# Patient Record
Sex: FEMALE | Race: WHITE | Employment: FULL TIME | URBAN - METROPOLITAN AREA
[De-identification: names, ages, dates, MRNs, and addresses within clinical notes are randomized per-mention and may not be internally consistent; named-entity substitution may affect disease eponyms.]

---

## 2024-08-07 ENCOUNTER — APPOINTMENT (OUTPATIENT)
Dept: OBSTETRICS AND GYNECOLOGY | Facility: CLINIC | Age: 39
End: 2024-08-07
Payer: COMMERCIAL

## 2024-08-07 VITALS
BODY MASS INDEX: 40.25 KG/M2 | WEIGHT: 205 LBS | SYSTOLIC BLOOD PRESSURE: 116 MMHG | DIASTOLIC BLOOD PRESSURE: 76 MMHG | HEIGHT: 60 IN

## 2024-08-07 DIAGNOSIS — Z01.419 WELL WOMAN EXAM: Primary | ICD-10-CM

## 2024-08-07 PROCEDURE — 1036F TOBACCO NON-USER: CPT | Performed by: OBSTETRICS & GYNECOLOGY

## 2024-08-07 PROCEDURE — 99395 PREV VISIT EST AGE 18-39: CPT | Performed by: OBSTETRICS & GYNECOLOGY

## 2024-08-07 PROCEDURE — 3008F BODY MASS INDEX DOCD: CPT | Performed by: OBSTETRICS & GYNECOLOGY

## 2024-08-07 NOTE — PROGRESS NOTES
Subjective   Patient ID: Tierra Daily is a 38 y.o. female who presents for Well woman visit.  Review of my annual note from ,       Javad Pabon MD  Physician  Specialty: Obstetrics and Gynecology     Progress Notes     Signed     Encounter Date: 2023    Signed           Orders     · IO UA (nonautomated w/o microscopy); Status:Resulted - Requires  Verification,Retrospective Authorization;   Done: [id number] 08:15AM     Patient Discussion/Summary     Established patient 37-year-old annual exam. Her son Jona who is almost 2-1/2. Born by . Spontaneous pregnancy     History of unexplained infertility. 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     Well woman exam. Cycles are regular. She is taking prenatal vitamins. Next Pap will be . Initiate mammography at age 39      Chief Complaint     Here for annual exam.      Review of Systems  Constitutional: no fever,~no chills,~no recent weight gain,~no recent weight loss~and~no fatigue.   Eyes: no eye pain,~no vision problems~and~no dryness of the eyes.   ENT: no hearing loss,~no nosebleeds~and~no sinus congestion.   Cardiovascular: no chest pain,~no palpitations~and~no orthopnea.   Respiratory: no shortness of breath,~no cough~and~no wheezing.   Gastrointestinal: no abdominal pain,~no constipation,~no nausea,~no diarrhea~and~no vomiting.   Genitourinary: no dysuria,~no urinary incontinence,~no vaginal dryness,~no vaginal itching,~no dyspareunia,~no pelvic pain,~no dysmenorrhea,~no sexual problems,~no change in urinary frequency,~no vaginal discharge,~no unexplained vaginal bleeding~and~no lesion/sore.   Musculoskeletal: no back pain,~no joint swelling~and~no leg edema.   Integumentary: no rashes,~no skin lesions,~no nipple discharge,~no breast pain~and~no breast lump.   Neurological: no headache,~no  numbness~and~no dizziness.   Psychiatric: no sleep disturbances,~no anxiety~and~no depression.   Endocrine: no hot flashes,~no loss of hair~and~no hirsutism.   Hematologic/Lymphatic: no swollen glands,~no tendency for easy bleeding~and~no tendency for easy bruising.   All other systems have been reviewed and are negative for complaint.      Active Problems  Problems    · Abnormal fetal echocardiogram affecting antepartum care of mother (655.83)  (O35.BXX0)   · AMA (advanced maternal age) primigravida 35+ (659.50) (O09.519)   · Birth control (V49.89) (Z78.9)   · BMI 45.0-49.9, adult (V85.42) (Z68.42)   · Cervical smear, as part of routine gynecological examination (V76.2) (Z01.419)   · Encounter for artificial insemination (V26.1) (Z31.89)   · Encounter for prenatal care of first pregnancy (V22.0) (Z34.00)   · Fertility testing (V26.21) (Z31.41)   · Gestational diabetes (648.80) (O24.419)   · Hematuria (599.70) (R31.9)   · Irregular menses (626.4) (N92.6)   · Pap smear for cervical cancer screening (V76.2) (Z12.4)   · Placenta previa (641.10) (O44.00)   · Routine screening for STI (sexually transmitted infection) (V74.5) (Z11.3)   · Urinary frequency (788.41) (R35.0)   · Urinary tract infection (599.0) (N39.0)     Past Medical History  Problems    · History of polycystic ovarian syndrome (V13.29) (Z87.42)   · No pertinent past medical history (V49.89) (Z78.9)     Surgical History  Problems    · History of Laparoscopy (Diagnostic)     Family History  Mother    · No pertinent family history  Father    · No pertinent family history  Brother    · Family history of diabetes mellitus (V18.0) (Z83.3)     Social History  Problems    · Never a smoker   · History of Never a smoker   · Social alcohol use (Z78.9)     Allergies  NoKnown    · No Known Allergies   Recorded By: Desire Zimmerman; 9/16/2015 4:27:10 PM     Current Meds       Medication Name Instruction  Accu-Chek FastClix Lancets For glucose testing 4 times  daily  Accu-Chek Guide In Vitro Strip TEST 4 TIMES DAILY.  Accu-Chek Guide w/Device Kit For glucose testing 4 times daily  Baby Aspirin 81 MG CHEW    BD Pen Needle Mena U/F 32G X 4 MM Once daily  Iron TABS    Levemir FlexTouch 100 UNIT/ML SOPN Inject 8 units every night at bedtime  PA Prenatal Formula 9-0.5 MG TABS       Vitals  Vital Signs       Recorded: [id number] 08:18AM Recorded: [id number] 08:16AM  Systolic 114    Diastolic 76    Height   5 ft   Weight   235 lb   BMI Calculated   45.9 kg/m2  BSA Calculated   2  LMP   [id number]     1  Para   1     Physical Exam     Constitutional: Alert and in no acute distress. Well developed, well nourished   Head and Face: Head and face: normal   Eyes: Normal external exam - nonicteric sclera, extraocular movements intact (EOMI) and no ptosis.   Ears, Nose, Mouth, and Throat: External inspection of ears and nose: normal   Neck: no neck asymmetry. Supple~and~thyroid not enlarged and there were no palpable thyroid nodules   Cardiovascular: Heart rate and rhythm were normal, normal S1 and S2, no gallops, and no murmurs   Pulmonary: No respiratory distress~and~clear bilateral breath sounds   Chest: Breasts: normal appearance, no nipple discharge and no skin changes~and~palpation of breasts and axillae: no palpable mass and no axillary lymphadenopathy   Abdomen: soft nontender; no abdominal mass palpated,~no organomegaly~and~no hernias   Genitourinary: external genitalia: normal,~no inguinal lymphadenopathy,~Bartholin's urethral and [place]'s glands: normal,~urethra: normal,~bladder: normal on palpation~and~perianal area: normal   Vagina: normal.   Cervix: Normal.   Uterus: Normal.   Right Adnexa/parametria: Normal.   Left Adnexa/parametria: Normal.   Musculoskeletal: no joint swelling seen, normal movements of all extremities   Skin: normal skin color and pigmentation, normal skin turgor, and no rash.   Neurologic: cranial nerves: non-focal. grossly intact   Psychiatric:  alert and oriented x 3.,~affect normal to patient baseline~and~mood: appropriate      Results/Data    IO UA (nonautomated w/o microscopy) [id number] 08:15AM Javad Pabon       Test Name Result Flag Reference  IO Glucose - Urine Negative      IO Blood Negative      IO Protein, Urine Negative      IO Nitrite, Urine Negative      IO Leukocytes Negative      IO Glucose - Urine Negative      IO Blood Negative      IO Protein, Urine Negative      IO Nitrite, Urine Negative      IO Leukocytes Negative                      PAP GYN, Cytology Final     No Documents Attached               Accession #: [id number] Date of Procedure: 6/28/2022  Pathologist: [facility], Cytology  Date Reported: 7/12/2022  Date Received: 6/28/2022  Submitting Physician: JAVAD PABON MD  Attending Physician: JAVDA PABON MD              FINAL CYTOLOGICAL INTERPRETATION           A. THINPREP PAP CERVICAL:      Specimen Adequacy:   SATISFACTORY FOR EVALUATION.   Quality Indicator: Absence of endocervical/transformation zone component.      General Categorization:   NEGATIVE FOR INTRAEPITHELIAL LESION OR MALIGNANCY.            HIGH RISK HPV TEST RESULT:      HPV GENOTYPE 16  NEGATIVE   HPV GENOTYPE 18  NEGATIVE   HPV GENOTYPE OTHER NEGATIVE      Reference Range: Negative           Slide(s) initially screened by a Cytotechnologist at [facility], [address] [place] [address]     QC review performed at [facility], [address] [place] [address]     Testing for high-risk (HR) type of human papilloma virus (HPV) is performed by  the Roche adan HPV Test. The adan HPV Test is a qualitative polymerase chain  reaction that amplifies DNA of HPV16, HPV18 and 12 other high-risk HPV types  (31, 33, 35, 39, 45, 51, 52, 56, 58, 59, 66, and 68) associated with cervical  cancer and its precursor lesions. A positive result indicates the presence of  HPV DNA due to  one or more of the 14 genotypes: 16, 18, 31, 33, 35, 39, 45, 51,  52, 56, 58, 59, 66, and 68. Negative results indicate HPV DNA concentrations  are undetectable or below the pre-set threshold for detection. False negative  results may be associated with unoptimized sampling. A negative HR HPV result  does not exclude the possibility of future cytologic HSIL or underlying CIN2-3  or cancer.     This test is approved for cervical specimens by the US Food and Drug  Administration. Results of this test should be interpreted in conjunction with  the patient?s Pap test results. Please refer to ASCCP current guidelines for  the use of HPV DNA testing, result interpretation, and patient management.  The performance of this test was verified by the Molecular Diagnostic  Laboratory at [facility]. The lab is  certified under the Clinical Laboratory Amendments of 1988 (CLIA 88) as  qualified to perform high complexity clinical laboratory testing.     This specimen has been analyzed by the [employer]Prep Imaging System ([employer]  an automated imaging and review system, which assists the laboratory in  evaluating cells on ThinPrep Pap tests. Following automated imaging, selected  fields from every slide were reviewed by a cytotechnologist and/or pathologist.        Electronically Signed Out By [facility],  Cytology//KMW/LSM  By the signature on this report, the individual or group listed as making the  Final Interpretation/Diagnosis certifies that they have reviewed this case.  Diagnostic interpretation performed at [facility] [address] [place] [address]  Educational Note:  Cervical cytology is a screening procedure primarily for squamous cancers and  precursors and has associated false-negative and false-positive results as  evidenced by published data. Your patient?s test should be interpreted in this  context, together with patient?s history and  clinical findings. Regular  sampling and follow-up of unexplained clinical signs and symptoms are  recommended to minimize false negative results.           Clinical History  Date of Last Menstrual Period: 06/15/2022     Other Clinical Conditions:  COTEST HPV(Genotype) except for ASC-H, HSIL, Carcinoma - Include HPV Genotype  testing     Clinical Diagnosis History: Pap smear for cervical cancer screening - (Z12.4)   Source of Specimen  A: THINPREP PAP CERVICAL              [facility]  Department of Pathology  [id number] [place] [address]            Ordered by: Javad Pabon Collected/Examined: 28Jun2022 08:35AM   Verified by: Javad Pabon [id number] 01:09PM    Result Communication: No patient communication needed at this time;  Stage: Final    Performed at: [facility] Cytology Resulted: 28Jun2022 01:38PM Last Updated: 12Jul2022 01:09PM Accession: [id number]      Signatures   Electronically signed by : Javad Pabon MD; Jul 5 2023  8:29AM EST (Author)            For annual today.  No new worries or concerns.  Cycles are regular.  No meds.  Non-smoker.  Last Pap 2022 was negative negative.  Son Jona is 3-1/2.  Starting .    Exam breast abdominal pelvic exams normal.  Well woman exam.  Start mammography after age 39.  Pap smear 2027.        Review of Systems   All other systems reviewed and are negative.      Objective   Physical Exam  Constitutional:       Appearance: Normal appearance.   Chest:   Breasts:     Right: Normal.      Left: Normal.   Genitourinary:     General: Normal vulva.      Vagina: Normal.      Uterus: Normal.       Adnexa: Right adnexa normal and left adnexa normal.   Neurological:      Mental Status: She is alert.         Assessment/Plan   Well woman exam.  Follow-up 1 year.  Start mammography next visit.  Has lost 30 pounds.  Peter is lost 70 pounds.  They are working on their weight loss journey together         Javad Pabon MD 08/07/24  8:34 AM

## 2024-11-01 ENCOUNTER — HOSPITAL ENCOUNTER (EMERGENCY)
Facility: HOSPITAL | Age: 39
Discharge: HOME | End: 2024-11-01
Attending: STUDENT IN AN ORGANIZED HEALTH CARE EDUCATION/TRAINING PROGRAM
Payer: MEDICARE

## 2024-11-01 ENCOUNTER — APPOINTMENT (OUTPATIENT)
Dept: RADIOLOGY | Facility: HOSPITAL | Age: 39
End: 2024-11-01
Payer: MEDICARE

## 2024-11-01 VITALS
TEMPERATURE: 97.5 F | SYSTOLIC BLOOD PRESSURE: 101 MMHG | HEIGHT: 60 IN | DIASTOLIC BLOOD PRESSURE: 86 MMHG | RESPIRATION RATE: 17 BRPM | BODY MASS INDEX: 45.16 KG/M2 | OXYGEN SATURATION: 98 % | HEART RATE: 75 BPM | WEIGHT: 230 LBS
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
## 2024-11-01 DIAGNOSIS — Z34.90 PREGNANCY, UNSPECIFIED GESTATIONAL AGE (HHS-HCC): ICD-10-CM

## 2024-11-01 LAB
ALBUMIN SERPL BCP-MCNC: 4 G/DL (ref 3.4–5)
ALP SERPL-CCNC: 60 U/L (ref 33–110)
ALT SERPL W P-5'-P-CCNC: 14 U/L (ref 7–45)
ANION GAP SERPL CALC-SCNC: 12 MMOL/L (ref 10–20)
AST SERPL W P-5'-P-CCNC: 10 U/L (ref 9–39)
B-HCG SERPL-ACNC: 5870 MIU/ML
BASOPHILS # BLD AUTO: 0.06 X10*3/UL (ref 0–0.1)
BASOPHILS NFR BLD AUTO: 0.6 %
BILIRUB SERPL-MCNC: 0.7 MG/DL (ref 0–1.2)
BUN SERPL-MCNC: 24 MG/DL (ref 6–23)
CALCIUM SERPL-MCNC: 10.5 MG/DL (ref 8.6–10.3)
CHLORIDE SERPL-SCNC: 103 MMOL/L (ref 98–107)
CO2 SERPL-SCNC: 24 MMOL/L (ref 21–32)
CREAT SERPL-MCNC: 0.91 MG/DL (ref 0.5–1.05)
EGFRCR SERPLBLD CKD-EPI 2021: 82 ML/MIN/1.73M*2
EOSINOPHIL # BLD AUTO: 0.16 X10*3/UL (ref 0–0.7)
EOSINOPHIL NFR BLD AUTO: 1.5 %
ERYTHROCYTE [DISTWIDTH] IN BLOOD BY AUTOMATED COUNT: 15.4 % (ref 11.5–14.5)
GLUCOSE SERPL-MCNC: 105 MG/DL (ref 74–99)
HCT VFR BLD AUTO: 38.5 % (ref 36–46)
HGB BLD-MCNC: 12.2 G/DL (ref 12–16)
IMM GRANULOCYTES # BLD AUTO: 0.05 X10*3/UL (ref 0–0.7)
IMM GRANULOCYTES NFR BLD AUTO: 0.5 % (ref 0–0.9)
LYMPHOCYTES # BLD AUTO: 2.76 X10*3/UL (ref 1.2–4.8)
LYMPHOCYTES NFR BLD AUTO: 25.8 %
MCH RBC QN AUTO: 26.2 PG (ref 26–34)
MCHC RBC AUTO-ENTMCNC: 31.7 G/DL (ref 32–36)
MCV RBC AUTO: 83 FL (ref 80–100)
MONOCYTES # BLD AUTO: 0.81 X10*3/UL (ref 0.1–1)
MONOCYTES NFR BLD AUTO: 7.6 %
NEUTROPHILS # BLD AUTO: 6.87 X10*3/UL (ref 1.2–7.7)
NEUTROPHILS NFR BLD AUTO: 64 %
NRBC BLD-RTO: 0 /100 WBCS (ref 0–0)
PLATELET # BLD AUTO: 325 X10*3/UL (ref 150–450)
POTASSIUM SERPL-SCNC: 3.6 MMOL/L (ref 3.5–5.3)
PROT SERPL-MCNC: 6.7 G/DL (ref 6.4–8.2)
RBC # BLD AUTO: 4.65 X10*6/UL (ref 4–5.2)
SODIUM SERPL-SCNC: 135 MMOL/L (ref 136–145)
WBC # BLD AUTO: 10.7 X10*3/UL (ref 4.4–11.3)

## 2024-11-01 PROCEDURE — 36415 COLL VENOUS BLD VENIPUNCTURE: CPT | Performed by: PHYSICIAN ASSISTANT

## 2024-11-01 PROCEDURE — 85025 COMPLETE CBC W/AUTO DIFF WBC: CPT | Performed by: PHYSICIAN ASSISTANT

## 2024-11-01 PROCEDURE — G0390 TRAUMA RESPONS W/HOSP CRITI: HCPCS

## 2024-11-01 PROCEDURE — 76801 OB US < 14 WKS SINGLE FETUS: CPT

## 2024-11-01 PROCEDURE — 99284 EMERGENCY DEPT VISIT MOD MDM: CPT | Mod: 25

## 2024-11-01 PROCEDURE — 71045 X-RAY EXAM CHEST 1 VIEW: CPT | Performed by: RADIOLOGY

## 2024-11-01 PROCEDURE — 76801 OB US < 14 WKS SINGLE FETUS: CPT | Performed by: RADIOLOGY

## 2024-11-01 PROCEDURE — 76817 TRANSVAGINAL US OBSTETRIC: CPT | Performed by: RADIOLOGY

## 2024-11-01 PROCEDURE — 84702 CHORIONIC GONADOTROPIN TEST: CPT | Performed by: PHYSICIAN ASSISTANT

## 2024-11-01 PROCEDURE — 80053 COMPREHEN METABOLIC PANEL: CPT | Performed by: PHYSICIAN ASSISTANT

## 2024-11-01 PROCEDURE — 71045 X-RAY EXAM CHEST 1 VIEW: CPT

## 2024-11-01 ASSESSMENT — COLUMBIA-SUICIDE SEVERITY RATING SCALE - C-SSRS
2. HAVE YOU ACTUALLY HAD ANY THOUGHTS OF KILLING YOURSELF?: NO
6. HAVE YOU EVER DONE ANYTHING, STARTED TO DO ANYTHING, OR PREPARED TO DO ANYTHING TO END YOUR LIFE?: NO
1. IN THE PAST MONTH, HAVE YOU WISHED YOU WERE DEAD OR WISHED YOU COULD GO TO SLEEP AND NOT WAKE UP?: NO

## 2024-11-01 ASSESSMENT — LIFESTYLE VARIABLES
HAVE YOU EVER FELT YOU SHOULD CUT DOWN ON YOUR DRINKING: NO
EVER FELT BAD OR GUILTY ABOUT YOUR DRINKING: NO
TOTAL SCORE: 0
HAVE PEOPLE ANNOYED YOU BY CRITICIZING YOUR DRINKING: NO
EVER HAD A DRINK FIRST THING IN THE MORNING TO STEADY YOUR NERVES TO GET RID OF A HANGOVER: NO

## 2024-11-01 ASSESSMENT — PAIN - FUNCTIONAL ASSESSMENT: PAIN_FUNCTIONAL_ASSESSMENT: 0-10

## 2024-11-01 ASSESSMENT — PAIN SCALES - GENERAL: PAINLEVEL_OUTOF10: 1

## 2024-11-01 ASSESSMENT — PAIN DESCRIPTION - ORIENTATION: ORIENTATION: RIGHT

## 2024-11-08 ENCOUNTER — OFFICE VISIT (OUTPATIENT)
Dept: OBSTETRICS AND GYNECOLOGY | Facility: CLINIC | Age: 39
End: 2024-11-08
Payer: COMMERCIAL

## 2024-11-08 VITALS
BODY MASS INDEX: 39.85 KG/M2 | HEIGHT: 60 IN | SYSTOLIC BLOOD PRESSURE: 118 MMHG | DIASTOLIC BLOOD PRESSURE: 74 MMHG | WEIGHT: 203 LBS

## 2024-11-08 DIAGNOSIS — O20.0 THREATENED ABORTION, ANTEPARTUM CONDITION OR COMPLICATION (HHS-HCC): ICD-10-CM

## 2024-11-08 DIAGNOSIS — O02.1 MISSED ABORTION (HHS-HCC): Primary | ICD-10-CM

## 2024-11-08 DIAGNOSIS — Z32.01 PREGNANCY TEST POSITIVE (HHS-HCC): ICD-10-CM

## 2024-11-08 LAB
POC BLOOD, URINE: ABNORMAL
POC GLUCOSE, URINE: NEGATIVE MG/DL
POC LEUKOCYTES, URINE: NEGATIVE
POC NITRITE,URINE: NEGATIVE
POC PROTEIN, URINE: NEGATIVE MG/DL
PREGNANCY TEST URINE, POC: POSITIVE

## 2024-11-08 PROCEDURE — 99214 OFFICE O/P EST MOD 30 MIN: CPT | Performed by: OBSTETRICS & GYNECOLOGY

## 2024-11-08 PROCEDURE — 3008F BODY MASS INDEX DOCD: CPT | Performed by: OBSTETRICS & GYNECOLOGY

## 2024-11-08 PROCEDURE — 1036F TOBACCO NON-USER: CPT | Performed by: OBSTETRICS & GYNECOLOGY

## 2024-11-08 PROCEDURE — 81002 URINALYSIS NONAUTO W/O SCOPE: CPT | Performed by: OBSTETRICS & GYNECOLOGY

## 2024-11-08 PROCEDURE — 81025 URINE PREGNANCY TEST: CPT | Performed by: OBSTETRICS & GYNECOLOGY

## 2024-11-08 PROCEDURE — 76830 TRANSVAGINAL US NON-OB: CPT | Performed by: OBSTETRICS & GYNECOLOGY
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
## 2024-11-12 ENCOUNTER — APPOINTMENT (OUTPATIENT)
Dept: OBSTETRICS AND GYNECOLOGY | Facility: CLINIC | Age: 39
End: 2024-11-12
Payer: COMMERCIAL

## 2024-11-15 ENCOUNTER — APPOINTMENT (OUTPATIENT)
Dept: OBSTETRICS AND GYNECOLOGY | Facility: CLINIC | Age: 39
End: 2024-11-15
Payer: COMMERCIAL

## 2024-11-15 VITALS — DIASTOLIC BLOOD PRESSURE: 66 MMHG | BODY MASS INDEX: 39.65 KG/M2 | SYSTOLIC BLOOD PRESSURE: 112 MMHG | WEIGHT: 203 LBS

## 2024-11-15 DIAGNOSIS — O03.9 SPONTANEOUS MISCARRIAGE (HHS-HCC): Primary | ICD-10-CM

## 2024-11-15 DIAGNOSIS — Z32.01 PREGNANCY TEST POSITIVE (HHS-HCC): ICD-10-CM

## 2024-11-15 DIAGNOSIS — O09.521 MULTIGRAVIDA OF ADVANCED MATERNAL AGE IN FIRST TRIMESTER (HHS-HCC): ICD-10-CM

## 2024-11-15 DIAGNOSIS — O20.0 THREATENED ABORTION, ANTEPARTUM CONDITION OR COMPLICATION (HHS-HCC): ICD-10-CM

## 2024-11-15 PROCEDURE — 81002 URINALYSIS NONAUTO W/O SCOPE: CPT | Performed by: OBSTETRICS & GYNECOLOGY

## 2024-11-15 PROCEDURE — 99214 OFFICE O/P EST MOD 30 MIN: CPT | Performed by: OBSTETRICS & GYNECOLOGY

## 2024-11-15 PROCEDURE — 81025 URINE PREGNANCY TEST: CPT | Performed by: OBSTETRICS & GYNECOLOGY

## 2024-11-15 PROCEDURE — 76830 TRANSVAGINAL US NON-OB: CPT | Performed by: OBSTETRICS & GYNECOLOGY

## 2024-11-15 PROCEDURE — 1036F TOBACCO NON-USER: CPT | Performed by: OBSTETRICS & GYNECOLOGY

## 2024-11-15 NOTE — PROGRESS NOTES
Subjective   Patient ID: Tierra Daily is a 39 y.o. female who presents for Follow-up.  Established patient 39 years old following up after being monitored for possible miscarriage.  Her bleeding has now tapered down.  It is getting lighter.  No significant pain.  Ultrasound performed today shows no evidence of intrauterine sac or pole.  Findings are consistent with spontaneous miscarriage.  We do not see any indication for surgery at this time.  If her bleeding continues and we have reviewed signs and symptoms of infection she should notify me.  I expect it should resolve spontaneously.    Her blood type is O+.  No indication for RhoGAM.  NuPrep they are not planning more children moving forward she would would like an IUD.  I will give her information on the Mirena and have her confirm insurance coverage.        Review of Systems   Genitourinary:  Positive for vaginal bleeding.       Objective   Physical Exam  Constitutional:       Appearance: Normal appearance. She is obese.   Neurological:      Mental Status: She is alert.         Assessment/Plan   Spontaneous miscarriage.  Warning signs reviewed.  Will return for Mirena insertion.         Javad Pabon MD 11/15/24 8:39 AM

## 2024-12-10 ENCOUNTER — APPOINTMENT (OUTPATIENT)
Dept: OBSTETRICS AND GYNECOLOGY | Facility: CLINIC | Age: 39
End: 2024-12-10
Payer: COMMERCIAL

## 2024-12-10 VITALS — WEIGHT: 205 LBS | BODY MASS INDEX: 40.04 KG/M2 | SYSTOLIC BLOOD PRESSURE: 116 MMHG | DIASTOLIC BLOOD PRESSURE: 68 MMHG

## 2024-12-10 DIAGNOSIS — Z30.430 ENCOUNTER FOR IUD INSERTION: ICD-10-CM

## 2024-12-10 LAB
POC BLOOD, URINE: ABNORMAL
POC GLUCOSE, URINE: NEGATIVE MG/DL
POC LEUKOCYTES, URINE: NEGATIVE
POC NITRITE,URINE: NEGATIVE
POC PROTEIN, URINE: NEGATIVE MG/DL
PREGNANCY TEST URINE, POC: NEGATIVE

## 2024-12-10 PROCEDURE — 58300 INSERT INTRAUTERINE DEVICE: CPT | Performed by: OBSTETRICS & GYNECOLOGY

## 2024-12-10 PROCEDURE — 81025 URINE PREGNANCY TEST: CPT | Performed by: OBSTETRICS & GYNECOLOGY

## 2024-12-10 PROCEDURE — 81002 URINALYSIS NONAUTO W/O SCOPE: CPT | Performed by: OBSTETRICS & GYNECOLOGY

## 2024-12-10 NOTE — PROGRESS NOTES
Patient ID: Tierra Daily is a 39 y.o. female.    IUD Insertion    Performed by: Javad Pabon MD  Authorized by: Javad Pabon MD    Procedure: IUD insertion    Consent obtained by patient, parent, or legal power of  - including discussion of procedure risks and benefits, patient questions answered, and patient education provided: yes    Pregnancy risk: reasonably certain the patient is not pregnant    Date/Time of Insertion:  12/10/2024 3:53 PM  Immediately prior to procedure a time out was called: yes    Pelvic exam performed: yes    Speculum placed in vagina: yes    Cervix cleaned and prepped: yes    Tenaculum/Allis/Ring Forceps applied to cervix: yes    Anesthesia used: no    Uterus sound depth (cm):  8  OSM: levonorgestreL 20.1 mcg/24 hr  Strings trimmed to (cm):  2  Patient tolerated procedure well: yes    Inserted with ultrasound guidance: no    Transvaginal sono confirmed fundal placement: no    Intended removal date: 8 years

## 2025-01-17 ENCOUNTER — APPOINTMENT (OUTPATIENT)
Dept: OBSTETRICS AND GYNECOLOGY | Facility: CLINIC | Age: 40
End: 2025-01-17
Payer: COMMERCIAL

## 2025-01-17 DIAGNOSIS — N93.9 ABNORMAL UTERINE BLEEDING: ICD-10-CM

## 2025-01-17 DIAGNOSIS — Z30.431 IUD CHECK UP: Primary | ICD-10-CM

## 2025-01-17 PROCEDURE — 76830 TRANSVAGINAL US NON-OB: CPT | Performed by: OBSTETRICS & GYNECOLOGY

## 2025-01-17 PROCEDURE — 99213 OFFICE O/P EST LOW 20 MIN: CPT | Performed by: OBSTETRICS & GYNECOLOGY

## 2025-01-17 NOTE — PROGRESS NOTES
Subjective   Patient ID: Tierra Daily is a 39 y.o. female who presents for IUD check.  Established patient 39 years old coming in after IUD insertion she had a cycle that was longer and lighter which is typical.  No fever chills or pain.  Abdominal and pelvic exam along with ultrasound confirmed proper placement.  IUD good for 8 years.  Reviewed that there can be some off-and-on bleeding but over time it tends to diminish.    Her son Jona is 4 years old.  .    Follow-up in July for annual exam        Review of Systems   Genitourinary:  Positive for vaginal bleeding.       Objective   Physical Exam  Constitutional:       Appearance: Normal appearance. She is obese.   Genitourinary:     General: Normal vulva.      Vagina: Normal.      Cervix: Normal.      Uterus: Normal.       Adnexa: Right adnexa normal and left adnexa normal.      Comments: Ultrasound confirms appropriate placement of IUD  Neurological:      Mental Status: She is alert.         Assessment/Plan   IUD check, abnormal bleeding.  Ultrasound confirms proper placement.  Follow-up in July for annual exam         Javad Pabon MD 25 9:40 AM

## 2025-01-21 ENCOUNTER — APPOINTMENT (OUTPATIENT)
Dept: OBSTETRICS AND GYNECOLOGY | Facility: CLINIC | Age: 40
End: 2025-01-21
Payer: COMMERCIAL

## 2025-03-04 ENCOUNTER — APPOINTMENT (OUTPATIENT)
Dept: PRIMARY CARE | Facility: CLINIC | Age: 40
End: 2025-03-04
Payer: COMMERCIAL

## 2025-08-14 ENCOUNTER — APPOINTMENT (OUTPATIENT)
Dept: OBSTETRICS AND GYNECOLOGY | Facility: CLINIC | Age: 40
End: 2025-08-14
Payer: COMMERCIAL

## 2025-08-19 DIAGNOSIS — Z01.419 WELL WOMAN EXAM: ICD-10-CM

## 2025-08-19 DIAGNOSIS — Z12.31 ENCOUNTER FOR SCREENING MAMMOGRAM FOR BREAST CANCER: ICD-10-CM

## 2025-08-30 ENCOUNTER — APPOINTMENT (OUTPATIENT)
Dept: RADIOLOGY | Facility: HOSPITAL | Age: 40
End: 2025-08-30
Payer: COMMERCIAL

## 2025-09-05 ENCOUNTER — APPOINTMENT (OUTPATIENT)
Dept: OBSTETRICS AND GYNECOLOGY | Facility: CLINIC | Age: 40
End: 2025-09-05
Payer: COMMERCIAL

## 2026-09-10 ENCOUNTER — APPOINTMENT (OUTPATIENT)
Dept: OBSTETRICS AND GYNECOLOGY | Facility: CLINIC | Age: 41
End: 2026-09-10
Payer: COMMERCIAL